# Patient Record
Sex: MALE | Race: WHITE | NOT HISPANIC OR LATINO | Employment: UNEMPLOYED | URBAN - NONMETROPOLITAN AREA
[De-identification: names, ages, dates, MRNs, and addresses within clinical notes are randomized per-mention and may not be internally consistent; named-entity substitution may affect disease eponyms.]

---

## 2021-07-10 ENCOUNTER — APPOINTMENT (OUTPATIENT)
Dept: CT IMAGING | Facility: HOSPITAL | Age: 46
End: 2021-07-10

## 2021-07-10 ENCOUNTER — APPOINTMENT (OUTPATIENT)
Dept: GENERAL RADIOLOGY | Facility: HOSPITAL | Age: 46
End: 2021-07-10

## 2021-07-10 ENCOUNTER — HOSPITAL ENCOUNTER (EMERGENCY)
Facility: HOSPITAL | Age: 46
Discharge: HOME | End: 2021-07-10
Attending: FAMILY MEDICINE | Admitting: EMERGENCY MEDICINE

## 2021-07-10 VITALS
HEART RATE: 93 BPM | BODY MASS INDEX: 24.78 KG/M2 | TEMPERATURE: 98.2 F | HEIGHT: 73 IN | RESPIRATION RATE: 18 BRPM | DIASTOLIC BLOOD PRESSURE: 103 MMHG | WEIGHT: 187 LBS | OXYGEN SATURATION: 97 % | SYSTOLIC BLOOD PRESSURE: 149 MMHG

## 2021-07-10 DIAGNOSIS — R09.1 PLEURISY: Primary | ICD-10-CM

## 2021-07-10 LAB
ALBUMIN SERPL-MCNC: 4.22 G/DL (ref 3.5–5.2)
ALBUMIN/GLOB SERPL: 2.1 G/DL
ALP SERPL-CCNC: 68 U/L (ref 39–117)
ALT SERPL W P-5'-P-CCNC: 9 U/L (ref 1–41)
ANION GAP SERPL CALCULATED.3IONS-SCNC: 10.5 MMOL/L (ref 5–15)
AST SERPL-CCNC: 15 U/L (ref 1–40)
BASOPHILS # BLD AUTO: 0.07 10*3/MM3 (ref 0–0.2)
BASOPHILS NFR BLD AUTO: 0.6 % (ref 0–1.5)
BILIRUB SERPL-MCNC: 0.3 MG/DL (ref 0–1.2)
BUN SERPL-MCNC: 22 MG/DL (ref 6–20)
BUN/CREAT SERPL: 26.2 (ref 7–25)
CALCIUM SPEC-SCNC: 9 MG/DL (ref 8.6–10.5)
CHLORIDE SERPL-SCNC: 109 MMOL/L (ref 98–107)
CO2 SERPL-SCNC: 23.5 MMOL/L (ref 22–29)
CREAT SERPL-MCNC: 0.84 MG/DL (ref 0.76–1.27)
D DIMER PPP FEU-MCNC: 0.3 MCGFEU/ML (ref 0–0.5)
D-LACTATE SERPL-SCNC: 0.6 MMOL/L (ref 0.5–2)
DEPRECATED RDW RBC AUTO: 43.4 FL (ref 37–54)
EOSINOPHIL # BLD AUTO: 0.27 10*3/MM3 (ref 0–0.4)
EOSINOPHIL NFR BLD AUTO: 2.4 % (ref 0.3–6.2)
ERYTHROCYTE [DISTWIDTH] IN BLOOD BY AUTOMATED COUNT: 13.2 % (ref 12.3–15.4)
FLUAV RNA RESP QL NAA+PROBE: NOT DETECTED
FLUBV RNA RESP QL NAA+PROBE: NOT DETECTED
GFR SERPL CREATININE-BSD FRML MDRD: 99 ML/MIN/1.73
GLOBULIN UR ELPH-MCNC: 2 GM/DL
GLUCOSE SERPL-MCNC: 90 MG/DL (ref 65–99)
HCT VFR BLD AUTO: 41.1 % (ref 37.5–51)
HGB BLD-MCNC: 14.2 G/DL (ref 13–17.7)
HOLD SPECIMEN: NORMAL
HOLD SPECIMEN: NORMAL
IMM GRANULOCYTES # BLD AUTO: 0.06 10*3/MM3 (ref 0–0.05)
IMM GRANULOCYTES NFR BLD AUTO: 0.5 % (ref 0–0.5)
INR PPP: 0.93 (ref 0.9–1.1)
LYMPHOCYTES # BLD AUTO: 3.59 10*3/MM3 (ref 0.7–3.1)
LYMPHOCYTES NFR BLD AUTO: 32 % (ref 19.6–45.3)
MAGNESIUM SERPL-MCNC: 2 MG/DL (ref 1.6–2.6)
MCH RBC QN AUTO: 30.8 PG (ref 26.6–33)
MCHC RBC AUTO-ENTMCNC: 34.5 G/DL (ref 31.5–35.7)
MCV RBC AUTO: 89.2 FL (ref 79–97)
MONOCYTES # BLD AUTO: 0.74 10*3/MM3 (ref 0.1–0.9)
MONOCYTES NFR BLD AUTO: 6.6 % (ref 5–12)
NEUTROPHILS NFR BLD AUTO: 57.9 % (ref 42.7–76)
NEUTROPHILS NFR BLD AUTO: 6.49 10*3/MM3 (ref 1.7–7)
NRBC BLD AUTO-RTO: 0 /100 WBC (ref 0–0.2)
NT-PROBNP SERPL-MCNC: 72.6 PG/ML (ref 0–450)
PLATELET # BLD AUTO: 206 10*3/MM3 (ref 140–450)
PMV BLD AUTO: 9.5 FL (ref 6–12)
POTASSIUM SERPL-SCNC: 4.2 MMOL/L (ref 3.5–5.2)
PROT SERPL-MCNC: 6.2 G/DL (ref 6–8.5)
PROTHROMBIN TIME: 12.9 SECONDS (ref 12.8–14.5)
RBC # BLD AUTO: 4.61 10*6/MM3 (ref 4.14–5.8)
SARS-COV-2 RNA RESP QL NAA+PROBE: NOT DETECTED
SODIUM SERPL-SCNC: 143 MMOL/L (ref 136–145)
TROPONIN T SERPL-MCNC: <0.01 NG/ML (ref 0–0.03)
WBC # BLD AUTO: 11.22 10*3/MM3 (ref 3.4–10.8)
WHOLE BLOOD HOLD SPECIMEN: NORMAL

## 2021-07-10 PROCEDURE — 85379 FIBRIN DEGRADATION QUANT: CPT | Performed by: FAMILY MEDICINE

## 2021-07-10 PROCEDURE — 71275 CT ANGIOGRAPHY CHEST: CPT

## 2021-07-10 PROCEDURE — 25010000002 DEXAMETHASONE PER 1 MG: Performed by: EMERGENCY MEDICINE

## 2021-07-10 PROCEDURE — 87636 SARSCOV2 & INF A&B AMP PRB: CPT | Performed by: FAMILY MEDICINE

## 2021-07-10 PROCEDURE — 83880 ASSAY OF NATRIURETIC PEPTIDE: CPT | Performed by: FAMILY MEDICINE

## 2021-07-10 PROCEDURE — 71045 X-RAY EXAM CHEST 1 VIEW: CPT

## 2021-07-10 PROCEDURE — 80053 COMPREHEN METABOLIC PANEL: CPT | Performed by: FAMILY MEDICINE

## 2021-07-10 PROCEDURE — 93005 ELECTROCARDIOGRAM TRACING: CPT | Performed by: FAMILY MEDICINE

## 2021-07-10 PROCEDURE — 96375 TX/PRO/DX INJ NEW DRUG ADDON: CPT

## 2021-07-10 PROCEDURE — 25010000002 KETOROLAC TROMETHAMINE PER 15 MG: Performed by: EMERGENCY MEDICINE

## 2021-07-10 PROCEDURE — 84484 ASSAY OF TROPONIN QUANT: CPT | Performed by: FAMILY MEDICINE

## 2021-07-10 PROCEDURE — 85610 PROTHROMBIN TIME: CPT | Performed by: FAMILY MEDICINE

## 2021-07-10 PROCEDURE — 96374 THER/PROPH/DIAG INJ IV PUSH: CPT

## 2021-07-10 PROCEDURE — 83735 ASSAY OF MAGNESIUM: CPT | Performed by: FAMILY MEDICINE

## 2021-07-10 PROCEDURE — 99284 EMERGENCY DEPT VISIT MOD MDM: CPT

## 2021-07-10 PROCEDURE — 93010 ELECTROCARDIOGRAM REPORT: CPT | Performed by: INTERNAL MEDICINE

## 2021-07-10 PROCEDURE — 83605 ASSAY OF LACTIC ACID: CPT | Performed by: FAMILY MEDICINE

## 2021-07-10 PROCEDURE — 0 IOPAMIDOL PER 1 ML: Performed by: EMERGENCY MEDICINE

## 2021-07-10 PROCEDURE — 85025 COMPLETE CBC W/AUTO DIFF WBC: CPT | Performed by: FAMILY MEDICINE

## 2021-07-10 RX ORDER — PREDNISONE 20 MG/1
20 TABLET ORAL
Qty: 21 TABLET | Refills: 0 | Status: SHIPPED | OUTPATIENT
Start: 2021-07-10 | End: 2021-07-10 | Stop reason: SDUPTHER

## 2021-07-10 RX ORDER — ASPIRIN 81 MG/1
324 TABLET, CHEWABLE ORAL ONCE
Status: COMPLETED | OUTPATIENT
Start: 2021-07-10 | End: 2021-07-10

## 2021-07-10 RX ORDER — KETOROLAC TROMETHAMINE 10 MG/1
10 TABLET, FILM COATED ORAL EVERY 6 HOURS PRN
Qty: 15 TABLET | Refills: 0 | Status: SHIPPED | OUTPATIENT
Start: 2021-07-10 | End: 2021-08-24

## 2021-07-10 RX ORDER — KETOROLAC TROMETHAMINE 30 MG/ML
30 INJECTION, SOLUTION INTRAMUSCULAR; INTRAVENOUS ONCE
Status: COMPLETED | OUTPATIENT
Start: 2021-07-10 | End: 2021-07-10

## 2021-07-10 RX ORDER — SODIUM CHLORIDE 0.9 % (FLUSH) 0.9 %
10 SYRINGE (ML) INJECTION AS NEEDED
Status: DISCONTINUED | OUTPATIENT
Start: 2021-07-10 | End: 2021-07-10 | Stop reason: HOSPADM

## 2021-07-10 RX ORDER — KETOROLAC TROMETHAMINE 10 MG/1
10 TABLET, FILM COATED ORAL EVERY 6 HOURS PRN
Qty: 15 TABLET | Refills: 0 | Status: SHIPPED | OUTPATIENT
Start: 2021-07-10 | End: 2021-07-10 | Stop reason: SDUPTHER

## 2021-07-10 RX ORDER — PREDNISONE 20 MG/1
20 TABLET ORAL
Qty: 21 TABLET | Refills: 0 | Status: SHIPPED | OUTPATIENT
Start: 2021-07-10 | End: 2021-08-24

## 2021-07-10 RX ORDER — DEXAMETHASONE SODIUM PHOSPHATE 10 MG/ML
10 INJECTION INTRAMUSCULAR; INTRAVENOUS ONCE
Status: COMPLETED | OUTPATIENT
Start: 2021-07-10 | End: 2021-07-10

## 2021-07-10 RX ADMIN — IOPAMIDOL 70 ML: 755 INJECTION, SOLUTION INTRAVENOUS at 19:22

## 2021-07-10 RX ADMIN — DEXAMETHASONE SODIUM PHOSPHATE 10 MG: 10 INJECTION INTRAMUSCULAR; INTRAVENOUS at 21:23

## 2021-07-10 RX ADMIN — KETOROLAC TROMETHAMINE 30 MG: 30 INJECTION, SOLUTION INTRAMUSCULAR; INTRAVENOUS at 21:24

## 2021-07-10 RX ADMIN — ASPIRIN 324 MG: 81 TABLET, CHEWABLE ORAL at 17:49

## 2021-07-10 NOTE — ED PROVIDER NOTES
Subjective   46-year-old male with history of hypertension presents to the emergency room with complaints of chest pain.  Patient reports left-sided chest pain for the past week.  He states that chest pain is sharp made worse with deep inspiration and coughing.  He states cough is nonproductive.  He reports that symptoms first began while hiking in [place] near one of his properties.  He states he started having this sharp left-sided chest pain denies radiation of pain.  He denies similar symptoms in the past.  Patient does report that he is a smoker.  Denies family history of cardiac disease.  Patient states that he went to an urgent treatment center on Thursday as well as had x-rays performed and is scheduled to see cardiology August 24.      History provided by:  Patient   used: No    Chest Pain  Pain location:  L chest  Pain quality: sharp    Pain radiates to:  Does not radiate  Pain severity:  Moderate  Duration:  1 week  Timing:  Constant  Relieved by:  Nothing  Worsened by:  Coughing, exertion and movement  Associated symptoms: cough    Associated symptoms: no abdominal pain, no back pain, no fever, no nausea, no palpitations, no PND, no shortness of breath, no vomiting and no weakness    Risk factors: hypertension, male sex and smoking        Review of Systems   Constitutional: Negative for fever.   Respiratory: Positive for cough. Negative for shortness of breath.    Cardiovascular: Positive for chest pain. Negative for palpitations and PND.   Gastrointestinal: Negative for abdominal pain, nausea and vomiting.   Musculoskeletal: Negative for back pain.   Neurological: Negative for weakness.   All other systems reviewed and are negative.      Past Medical History:   Diagnosis Date   • Arthritis    • Hypertension    • Lumbar disc herniation        Allergies   Allergen Reactions   • Penicillins        Past Surgical History:   Procedure Laterality Date   • MINIMALLY INVASIVE  MICRODISCECTOMY LUMBAR SPINE  2009   • MINIMALLY INVASIVE MICRODISCECTOMY LUMBAR SPINE  2016       No family history on file.    Social History     Socioeconomic History   • Marital status:      Spouse name: Not on file   • Number of children: Not on file   • Years of education: Not on file   • Highest education level: Not on file   Tobacco Use   • Smoking status: Current Every Day Smoker     Packs/day: 1.00     Types: Cigarettes   Substance and Sexual Activity   • Alcohol use: Yes     Comment: rarely   • Drug use: No   • Sexual activity: Defer           Objective   Physical Exam  Vitals and nursing note reviewed.   Constitutional:       Appearance: He is not ill-appearing.   HENT:      Head: Normocephalic and atraumatic.      Comments: Moist mucous membranes  Eyes:      Pupils: Pupils are equal, round, and reactive to light.   Neck:      Vascular: No JVD.   Cardiovascular:      Rate and Rhythm: Normal rate.  No extrasystoles are present.     Pulses:           Radial pulses are 2+ on the right side and 2+ on the left side.        Dorsalis pedis pulses are 2+ on the right side and 2+ on the left side.      Heart sounds: No murmur heard.     Pulmonary:      Effort: Pulmonary effort is normal.      Breath sounds: Normal breath sounds.      Comments: No rhonchi's rales or wheezes.  No accessory muscle use chest wall nontender to palpation.  No crepitance.  Symmetric chest wall movement.  Abdominal:      General: Bowel sounds are normal. There is no abdominal bruit.      Palpations: Abdomen is soft.      Tenderness: There is no abdominal tenderness. There is no guarding or rebound.   Musculoskeletal:         General: Normal range of motion.      Cervical back: Neck supple.      Right lower leg: No tenderness. No edema.      Left lower leg: No tenderness. No edema.   Skin:     General: Skin is warm and dry.      Capillary Refill: Capillary refill takes less than 2 seconds.   Neurological:      General: No focal  deficit present.      Mental Status: He is alert and oriented to person, place, and time.      Cranial Nerves: No cranial nerve deficit.   Psychiatric:         Mood and Affect: Mood normal.         Procedures           ED Course  ED Course as of Jul 10 2145   Sat Jul 10, 2021   1733 EKG normal sinus rhythm rate 73   .    [BB]   1802 Patient's lactic acid unremarkable patient WBC 11.22    [BB]   1805 Chest x-ray is unremarkable    [BB]   1822 Covid flu negative    [BB]   1827 D-dimer coags unremarkable    [BB]   1842 BNP unremarkable    [BB]   1844 Patient troponin negative x1 set.    [BB]   1911 Have discussed case with Dr. Hammer who has assumed care    [BB]   2043 IMPRESSION:  Impression:     1. No evidence of pulmonary embolus on this study.     2. Mild bibasilar dependent atelectasis.   CT Chest Pulmonary Embolism [ES]   2043 IMPRESSION:  No acute cardiopulmonary findings.   XR Chest 1 View [ES]   2044 Normal sinus rhythm  Possible Left atrial enlargement  Borderline ECG  When compared with ECG of 19-APR-2016 17:05,  No significant change was found  Vent. rate 73 BPM  OK interval 138 ms  QRS duration 106 ms  QT/QTcB 388/427 ms  P-R-T axes 72 89 45   ECG 12 Lead [ES]      ED Course User Index  [BB] Fermin Srivastava MD  [ES] Sivakumar Hammer MD                                           MDM  Number of Diagnoses or Management Options  Pleurisy: new and requires workup     Amount and/or Complexity of Data Reviewed  Clinical lab tests: reviewed and ordered  Tests in the radiology section of CPT®: reviewed and ordered  Tests in the medicine section of CPT®: reviewed and ordered  Review and summarize past medical records: yes  Discuss the patient with other providers: yes  Independent visualization of images, tracings, or specimens: yes    Risk of Complications, Morbidity, and/or Mortality  Presenting problems: moderate  Diagnostic procedures: moderate  Management options:  moderate    Patient Progress  Patient progress: stable      Final diagnoses:   Pleurisy       ED Disposition  ED Disposition     ED Disposition Condition Comment    Discharge Stable           Garry Yang MD  [address]  [facility] [address] [phone]    Schedule an appointment as soon as possible for a visit in 1 day  EVALUATE         Medication List      New Prescriptions    ketorolac 10 MG tablet  Commonly known as: TORADOL  Take 1 tablet by mouth Every 6 (Six) Hours As Needed for Severe Pain .     predniSONE 20 MG tablet  Commonly known as: DELTASONE  Take 1 tablet by mouth 3 (Three) Times a Day With Meals.           Where to Get Your Medications      You can get these medications from any pharmacy    Bring a paper prescription for each of these medications  · ketorolac 10 MG tablet  · predniSONE 20 MG tablet          Sivakumar Hammer MD  07/10/21 [id number]

## 2021-07-11 LAB
QT INTERVAL: 388 MS
QTC INTERVAL: 427 MS

## 2021-08-24 ENCOUNTER — OFFICE VISIT (OUTPATIENT)
Dept: CARDIOLOGY | Facility: CLINIC | Age: 46
End: 2021-08-24

## 2021-08-24 VITALS
TEMPERATURE: 98 F | DIASTOLIC BLOOD PRESSURE: 76 MMHG | SYSTOLIC BLOOD PRESSURE: 125 MMHG | WEIGHT: 170.2 LBS | HEIGHT: 73 IN | HEART RATE: 72 BPM | BODY MASS INDEX: 22.56 KG/M2

## 2021-08-24 DIAGNOSIS — R07.2 PRECORDIAL PAIN: Primary | ICD-10-CM

## 2021-08-24 DIAGNOSIS — Z72.0 TOBACCO ABUSE: ICD-10-CM

## 2021-08-24 PROCEDURE — 99204 OFFICE O/P NEW MOD 45 MIN: CPT | Performed by: INTERNAL MEDICINE

## 2021-08-24 PROCEDURE — 93000 ELECTROCARDIOGRAM COMPLETE: CPT | Performed by: INTERNAL MEDICINE

## 2021-08-24 RX ORDER — TRAMADOL HYDROCHLORIDE 50 MG/1
50 TABLET ORAL 3 TIMES DAILY PRN
COMMUNITY
Start: 2021-05-27 | End: 2021-09-06

## 2021-08-24 NOTE — PROGRESS NOTES
Garry Yang MD  Randy Mccarthy  1975 08/24/2021    Patient Active Problem List   Diagnosis   • Lumbar disc herniation   • Neuritis or radiculitis due to rupture of lumbar intervertebral disc   • Foraminal stenosis of lumbar region       Dear Garry Yang MD:    Subjective     Randy Mccarthy is a 46 y.o. male with the problems as listed above, presents    Chief complaint: Recent chest pains.    History of Present Illness: Mr. Mccarthy is a pleasant 45-year-old  male with no history of known heart disease or coronary artery disease, nondiabetic, nonhypertensive, has history of smoking about a pack a day for the 30 years, presents with complaints of having left-sided chest pain about 8 weeks ago.  This was a localized sharp pain associated with some shortness of breath.  He went to the emergency room at [facility] where he was evaluated and was diagnosed with pleurisy.  Patient was given some steroids and antibiotics and the pain has gotten better since.  He denies any significant dyspnea, PND, orthopnea or pedal edema.  He denies any family history of premature coronary artery disease.      Allergies   Allergen Reactions   • Penicillins        Current Outpatient Medications:   •  ibuprofen (ADVIL,MOTRIN) 800 MG tablet, Take 800 mg by mouth 2 (two) times a day., Disp: , Rfl:   •  traMADol (ULTRAM) 50 MG tablet, Take 50 mg by mouth 3 (Three) Times a Day As Needed., Disp: , Rfl:     Past Medical History:   Diagnosis Date   • Arthritis    • Hypertension    • Lumbar disc herniation      Past Surgical History:   Procedure Laterality Date   • MINIMALLY INVASIVE MICRODISCECTOMY LUMBAR SPINE  2009   • MINIMALLY INVASIVE MICRODISCECTOMY LUMBAR SPINE  2016     History reviewed. No pertinent family history.  Social History     Tobacco Use   • Smoking status: Current Every Day Smoker     Packs/day: 1.00     Types: Cigarettes   • Smokeless tobacco: Never Used   Substance Use Topics   •  "Alcohol use: Yes     Comment: rarely   • Drug use: No       Review of Systems   Constitutional: Negative for chills, fever, malaise/fatigue, weight gain and weight loss.   HENT: Negative for congestion and nosebleeds.    Cardiovascular: Positive for chest pain. Negative for irregular heartbeat, leg swelling and orthopnea.   Respiratory: Negative for cough, hemoptysis and shortness of breath.    Skin: Negative.    Musculoskeletal: Positive for back pain and muscle cramps.   Gastrointestinal: Negative for abdominal pain, change in bowel habit, hematemesis, melena and vomiting.   Genitourinary: Negative for dysuria, frequency and hematuria.   Neurological: Negative for focal weakness, headaches, light-headedness and weakness.   Psychiatric/Behavioral: Negative.      Objective   Blood pressure 125/76, pulse 72, temperature 98 °F (36.7 °C), height 185.4 cm (72.99\"), weight 77.2 kg (170 lb 3.2 oz).  Body mass index is 22.46 kg/m².    Vitals reviewed.   Constitutional:       Appearance: Well-developed.   Eyes:      Conjunctiva/sclera: Conjunctivae normal.   HENT:      Head: Normocephalic.   Neck:      Thyroid: No thyromegaly.      Vascular: No JVD.      Trachea: No tracheal deviation.   Pulmonary:      Effort: No respiratory distress.      Breath sounds: Normal breath sounds. No wheezing. No rales.   Cardiovascular:      PMI at left midclavicular line. Normal rate. Regular rhythm. Normal S1. Normal S2.      Murmurs: There is no murmur.      No gallop. No click. No rub.   Pulses:     Intact distal pulses.   Edema:     Peripheral edema absent.   Abdominal:      General: Bowel sounds are normal.      Palpations: Abdomen is soft. There is no abdominal mass.      Tenderness: There is no abdominal tenderness.   Musculoskeletal:      Cervical back: Normal range of motion and neck supple. Skin:     General: Skin is warm and dry.   Neurological:      Mental Status: Alert and oriented to person, place, and time.      Cranial Nerves: " No cranial nerve deficit.         Lab Results   Component Value Date     07/10/2021    K 4.2 07/10/2021     (H) 07/10/2021    CO2 23.5 07/10/2021    BUN 22 (H) 07/10/2021    CREATININE 0.84 07/10/2021    GLUCOSE 90 07/10/2021    CALCIUM 9.0 07/10/2021    AST 15 07/10/2021    ALT 9 07/10/2021    ALKPHOS 68 07/10/2021     No results found for: CKTOTAL  Lab Results   Component Value Date    WBC 11.22 (H) 07/10/2021    HGB 14.2 07/10/2021    HCT 41.1 07/10/2021     07/10/2021     Lab Results   Component Value Date    INR 0.93 07/10/2021    INR 0.98 04/19/2016     Lab Results   Component Value Date    MG 2.0 07/10/2021         ECG 12 Lead    Date/Time: 8/24/2021 2:36 PM  Performed by: Rj Bowman MD  Authorized by: Rj Bowman MD   Comparison: compared with previous ECG from 7/10/2021  Similar to previous ECG  Rhythm: sinus rhythm  BPM: 71  Conduction: conduction normal  ST Segments: ST segments normal  T Waves: T waves normal                  Assessment/Plan :   Diagnosis Plan   1. Precordial pain  Treadmill Stress Test   2. Tobacco abuse           Recommendations:  Orders Placed This Encounter   Procedures   • Treadmill Stress Test   • ECG 12 Lead      1. We will evaluate his chest pains with a treadmill stress EKG test due to risk factors of age, male gender and smoking.  2. I have also strongly emphasized for him to quit smoking.    Return in about 6 weeks (around 10/5/2021).    As always, I appreciate very much the opportunity to participate in the cardiovascular care of your patients.      With Best Regards,    Rj Bowman MD, Merged with [facility]    Dragon disclaimer:  Much of this encounter note is an electronic transcription/translation of spoken language to printed text. The electronic translation of spoken language may permit erroneous, or at times, nonsensical words or phrases to be inadvertently transcribed; Although I have reviewed the note for such errors, some may still exist.

## 2021-09-06 ENCOUNTER — HOSPITAL ENCOUNTER (EMERGENCY)
Facility: HOSPITAL | Age: 46
Discharge: HOME | End: 2021-09-06
Attending: STUDENT IN AN ORGANIZED HEALTH CARE EDUCATION/TRAINING PROGRAM | Admitting: STUDENT IN AN ORGANIZED HEALTH CARE EDUCATION/TRAINING PROGRAM

## 2021-09-06 ENCOUNTER — APPOINTMENT (OUTPATIENT)
Dept: GENERAL RADIOLOGY | Facility: HOSPITAL | Age: 46
End: 2021-09-06

## 2021-09-06 VITALS
SYSTOLIC BLOOD PRESSURE: 137 MMHG | OXYGEN SATURATION: 97 % | HEIGHT: 73 IN | WEIGHT: 180 LBS | BODY MASS INDEX: 23.86 KG/M2 | HEART RATE: 73 BPM | RESPIRATION RATE: 16 BRPM | TEMPERATURE: 98.3 F | DIASTOLIC BLOOD PRESSURE: 83 MMHG

## 2021-09-06 DIAGNOSIS — S92.044A CLOSED NONDISPLACED FRACTURE OF TUBEROSITY OF RIGHT CALCANEUS, UNSPECIFIED FRACTURE MORPHOLOGY, INITIAL ENCOUNTER: Primary | ICD-10-CM

## 2021-09-06 PROCEDURE — 73610 X-RAY EXAM OF ANKLE: CPT

## 2021-09-06 PROCEDURE — 73630 X-RAY EXAM OF FOOT: CPT

## 2021-09-06 PROCEDURE — 99283 EMERGENCY DEPT VISIT LOW MDM: CPT

## 2021-09-06 RX ORDER — HYDROCODONE BITARTRATE AND ACETAMINOPHEN 5; 325 MG/1; MG/1
1 TABLET ORAL ONCE
Status: COMPLETED | OUTPATIENT
Start: 2021-09-06 | End: 2021-09-06

## 2021-09-06 RX ORDER — HYDROCODONE BITARTRATE AND ACETAMINOPHEN 5; 325 MG/1; MG/1
1 TABLET ORAL EVERY 6 HOURS PRN
Qty: 12 TABLET | Refills: 0 | Status: SHIPPED | OUTPATIENT
Start: 2021-09-06 | End: 2021-09-09

## 2021-09-06 RX ADMIN — HYDROCODONE BITARTRATE AND ACETAMINOPHEN 1 TABLET: 5; 325 TABLET ORAL at 18:16

## 2021-09-06 NOTE — ED TRIAGE NOTES
MEDICAL SCREENING:    Reason for Visit: right foot and ankle pain since fall yesterday    Patient initially seen in triage.  The patient was advised further evaluation and diagnostic testing will be needed, some of the treatment and testing will be initiated in the lobby in order to begin the process.  The patient will be returned to the waiting area for the time being and possibly be re-assessed by a subsequent ED provider.  The patient will be brought back to the treatment area in as timely manner as possible.

## 2021-09-06 NOTE — ED PROVIDER NOTES
Subjective   This is a 46 year old male patient who presents to the ER with chief complaint of right foot injury. Last night, he slipped off of an embankment and twisted his right foot and ankle. Since then he has had pain in the right ankle radiating into the right foot that is aching and moderate. Able to ambulated but painful. No numbness or tingling.           Review of Systems   Constitutional: Negative.  Negative for fever.   HENT: Negative.    Respiratory: Negative.    Cardiovascular: Negative.  Negative for chest pain.   Gastrointestinal: Negative.  Negative for abdominal pain.   Endocrine: Negative.    Genitourinary: Negative.  Negative for dysuria.   Musculoskeletal: Negative for arthralgias, back pain, gait problem, joint swelling, myalgias, neck pain and neck stiffness.        Right foot and ankle pain    Skin: Negative.    Neurological: Negative.    Psychiatric/Behavioral: Negative.    All other systems reviewed and are negative.      Past Medical History:   Diagnosis Date   • Arthritis    • Hypertension    • Lumbar disc herniation        Allergies   Allergen Reactions   • Penicillins Unknown - High Severity     Pt does not recall reaction.        Past Surgical History:   Procedure Laterality Date   • MINIMALLY INVASIVE MICRODISCECTOMY LUMBAR SPINE  2009   • MINIMALLY INVASIVE MICRODISCECTOMY LUMBAR SPINE  2016       No family history on file.    Social History     Socioeconomic History   • Marital status:      Spouse name: Not on file   • Number of children: Not on file   • Years of education: Not on file   • Highest education level: Not on file   Tobacco Use   • Smoking status: Current Every Day Smoker     Packs/day: 1.00     Types: Cigarettes   • Smokeless tobacco: Never Used   Substance and Sexual Activity   • Alcohol use: Yes     Comment: rarely   • Drug use: No   • Sexual activity: Defer           Objective   Physical Exam  Vitals and nursing note reviewed.   Constitutional:       General:  He is not in acute distress.     Appearance: He is well-developed. He is not diaphoretic.   HENT:      Head: Normocephalic and atraumatic.      Right Ear: External ear normal.      Left Ear: External ear normal.      Nose: Nose normal.   Eyes:      Conjunctiva/sclera: Conjunctivae normal.      Pupils: Pupils are equal, round, and reactive to light.   Neck:      Vascular: No JVD.      Trachea: No tracheal deviation.   Cardiovascular:      Rate and Rhythm: Normal rate and regular rhythm.      Heart sounds: Normal heart sounds. No murmur heard.     Pulmonary:      Effort: Pulmonary effort is normal. No respiratory distress.      Breath sounds: Normal breath sounds. No wheezing.   Abdominal:      General: Bowel sounds are normal.      Palpations: Abdomen is soft.      Tenderness: There is no abdominal tenderness.   Musculoskeletal:         General: Swelling, tenderness and signs of injury present. No deformity. Normal range of motion.      Cervical back: Normal range of motion and neck supple.      Comments: Right foot and ankle skin intact with no bruising or abrasion. Edema and tenderness to palpation noted diffusely. ROM active but limited and painful. Neurovascular status and sensation RLE intact.    Skin:     General: Skin is warm and dry.      Coloration: Skin is not pale.      Findings: No erythema or rash.   Neurological:      Mental Status: He is alert and oriented to person, place, and time.      Cranial Nerves: No cranial nerve deficit.   Psychiatric:         Behavior: Behavior normal.         Thought Content: Thought content normal.         Splint - Cast - Strapping    Date/Time: 9/6/2021 6:11 PM  Performed by: Kendal Bustillo PA  Authorized by: Cyrus Davis DO     Consent:     Consent obtained:  Verbal    Consent given by:  Patient    Risks discussed:  Discoloration, numbness, pain and swelling    Alternatives discussed:  Referral, observation, alternative treatment, delayed treatment and no  treatment  Pre-procedure details:     Sensation:  Normal    Skin color:  Normal  Procedure details:     Laterality:  Right    Location:  Foot    Foot:  R foot    Strapping: no      Splint type:  CAM walker boot    Supplies:  Prefabricated splint  Post-procedure details:     Pain:  Improved    Sensation:  Normal    Skin color:  Normal    Patient tolerance of procedure:  Tolerated well, no immediate complications               ED Course  ED Course as of Sep 06 1812   Mon Sep 06, 2021   1732 IMPRESSION:  Minimally displaced likely intra-articular fracture of the posterior tuberosity and posterior facet of the calcaneus. Associated soft tissue swelling.     Signer Name: LAUREL Ochoa MD   Signed: 9/6/2021 5:21 PM   Workstation Name: [facility]    [facility]   XR Ankle 3+ View Right [MM]   1732 IMPRESSION:  Minimally displaced likely intra-articular fracture of the posterior tuberosity and posterior facet of the calcaneus. Associated soft tissue swelling.     Signer Name: LAUREL Ochoa MD   Signed: 9/6/2021 5:21 PM   Workstation Name: [facility]    [facility]   XR Foot 3+ View Right [MM]   1806 No previous prescriptions per lydia report. Will be d/c home with rx for norco in boot with no weight bearing on right calcaneal fracture. Will call ortho tomorrow or return to ER if symptoms worsen.     [MM]      ED Course User Index  [MM] Kendal Bustillo PA                                           MDM  Number of Diagnoses or Management Options     Amount and/or Complexity of Data Reviewed  Tests in the radiology section of CPT®: ordered and reviewed  Discuss the patient with other providers: yes        Final diagnoses:   Closed nondisplaced fracture of tuberosity of right calcaneus, unspecified fracture morphology, initial encounter       ED Disposition  ED Disposition     ED Disposition Condition Comment    Discharge Stable           Walt Martínez, DO  [address]  [facility] [address]  [phone]    Call on 9/7/2021           Medication List      Stop    traMADol 50 MG tablet  Commonly known as: Kendal Ruano PA  09/06/21 181

## 2021-09-06 NOTE — DISCHARGE INSTRUCTIONS
Please remain in boot and don't put weight on your foot. Please utilize your pain medication, rest, ice and elevation for pain relief. Please call ortho for your appointment tomorrow. Please return to ER if symptoms worsen.

## 2021-09-07 ENCOUNTER — HOSPITAL ENCOUNTER (OUTPATIENT)
Dept: HOSPITAL 79 - KOH-I | Age: 46
End: 2021-09-07
Attending: PODIATRIST
Payer: COMMERCIAL

## 2021-09-07 DIAGNOSIS — S92.011A: Primary | ICD-10-CM

## 2021-09-08 ENCOUNTER — APPOINTMENT (OUTPATIENT)
Dept: CARDIOLOGY | Facility: HOSPITAL | Age: 46
End: 2021-09-08

## 2021-10-18 ENCOUNTER — HOSPITAL ENCOUNTER (OUTPATIENT)
Dept: HOSPITAL 79 - KOH-I | Age: 46
End: 2021-10-18
Attending: PODIATRIST
Payer: COMMERCIAL

## 2021-10-18 DIAGNOSIS — S92.001D: Primary | ICD-10-CM

## 2024-02-13 PROBLEM — G89.4 CHRONIC PAIN SYNDROME: Status: ACTIVE | Noted: 2024-02-13

## 2024-02-13 PROBLEM — M96.1 POSTLAMINECTOMY SYNDROME, LUMBAR REGION: Status: ACTIVE | Noted: 2024-02-13

## 2024-02-13 NOTE — H&P (VIEW-ONLY)
[facility]         History and Physical    MRN: [id number]  Patient Name: Randy Mccarthy  : 1975  Gender: male   Physician: Moreno Carter DO  Date of Service: 2024    HPI: CHRONIC BACK PAIN    History:    Past Medical History:   Diagnosis Date    Arthritis     Hypertension     Low back pain     Lumbar disc herniation     Lumbosacral disc disease        Past Surgical History:   Procedure Laterality Date    BACK SURGERY      MINIMALLY INVASIVE MICRODISCECTOMY LUMBAR SPINE  2009    MINIMALLY INVASIVE MICRODISCECTOMY LUMBAR SPINE  2016       Social History     Socioeconomic History    Marital status:    Tobacco Use    Smoking status: Every Day     Packs/day: 1.00     Years: 15.00     Additional pack years: 0.00     Total pack years: 15.00     Types: Cigarettes     Start date: 2000    Smokeless tobacco: Never   Substance and Sexual Activity    Alcohol use: Yes     Comment: rarely    Drug use: No    Sexual activity: Yes     Partners: Female       No family history on file.    Prior to Admission Medications:  (Not in a hospital admission)      Allergies:  Allergies   Allergen Reactions    Penicillins Unknown - High Severity     Pt does not recall reaction.         Vitals: BP: ()/()   Arterial Line BP: ()/()      Review of Systems:   Within Normal Limits Abnormal   HEENT [x]    []     Cardiovascular [x]   []     Gastrointestinal [x]   []     Genitourinary [x]   []     Neurologic [x]   []     Pulmonary [x]   []       Physical Exam:   Within Normal Limits Abnormal   Head, Neck, and Throat [x]    []     Heart [x]   []     Lungs [x]   []     Abdomen [x]   []     Extremities [x]   []     Mental Status [x]    []        Diagnosis: M96.1, G89.4    Plan: SPINAL CORD STIMULATOR INSERTION PHASE 2 CPT: 63650X2, 68080 (N/A)     [x]   H&P revealed no contraindication to planned surgery.     [x]   Labs (if ordered) have been reviewed and revealed no contraindications to planned  surgery.    RISKS, BENEFITS, and ALTERNATIVES discussed with patient who would like to proceed.     Moreno Carter,   2/13/2024

## 2024-02-22 ENCOUNTER — ANESTHESIA EVENT (OUTPATIENT)
Dept: PERIOP | Facility: HOSPITAL | Age: 49
End: 2024-02-22
Payer: COMMERCIAL

## 2024-02-26 ENCOUNTER — HOSPITAL ENCOUNTER (OUTPATIENT)
Facility: HOSPITAL | Age: 49
Setting detail: HOSPITAL OUTPATIENT SURGERY
Discharge: HOME | End: 2024-02-26
Attending: ANESTHESIOLOGY | Admitting: ANESTHESIOLOGY
Payer: COMMERCIAL

## 2024-02-26 ENCOUNTER — APPOINTMENT (OUTPATIENT)
Dept: GENERAL RADIOLOGY | Facility: HOSPITAL | Age: 49
End: 2024-02-26
Payer: COMMERCIAL

## 2024-02-26 ENCOUNTER — ANESTHESIA (OUTPATIENT)
Dept: PERIOP | Facility: HOSPITAL | Age: 49
End: 2024-02-26
Payer: COMMERCIAL

## 2024-02-26 VITALS
WEIGHT: 182 LBS | TEMPERATURE: 97 F | HEIGHT: 73 IN | BODY MASS INDEX: 24.12 KG/M2 | DIASTOLIC BLOOD PRESSURE: 85 MMHG | HEART RATE: 68 BPM | RESPIRATION RATE: 18 BRPM | OXYGEN SATURATION: 97 % | SYSTOLIC BLOOD PRESSURE: 123 MMHG

## 2024-02-26 DIAGNOSIS — G89.4 CHRONIC PAIN SYNDROME: ICD-10-CM

## 2024-02-26 DIAGNOSIS — M96.1 POSTLAMINECTOMY SYNDROME, LUMBAR REGION: ICD-10-CM

## 2024-02-26 PROCEDURE — 25010000002 MIDAZOLAM PER 1 MG: Performed by: NURSE ANESTHETIST, CERTIFIED REGISTERED

## 2024-02-26 PROCEDURE — C1889 IMPLANT/INSERT DEVICE, NOC: HCPCS | Performed by: ANESTHESIOLOGY

## 2024-02-26 PROCEDURE — 25010000002 PROPOFOL 200 MG/20ML EMULSION: Performed by: NURSE ANESTHETIST, CERTIFIED REGISTERED

## 2024-02-26 PROCEDURE — C1822 GEN, NEURO, HF, RECHG BAT: HCPCS | Performed by: ANESTHESIOLOGY

## 2024-02-26 PROCEDURE — 25810000003 LACTATED RINGERS PER 1000 ML: Performed by: ANESTHESIOLOGY

## 2024-02-26 PROCEDURE — C1897 LEAD, NEUROSTIM TEST KIT: HCPCS | Performed by: ANESTHESIOLOGY

## 2024-02-26 PROCEDURE — 76000 FLUOROSCOPY <1 HR PHYS/QHP: CPT

## 2024-02-26 PROCEDURE — C1894 INTRO/SHEATH, NON-LASER: HCPCS | Performed by: ANESTHESIOLOGY

## 2024-02-26 PROCEDURE — 25010000002 FENTANYL CITRATE (PF) 50 MCG/ML SOLUTION: Performed by: NURSE ANESTHETIST, CERTIFIED REGISTERED

## 2024-02-26 PROCEDURE — 25010000002 BUPIVACAINE 0.5 % SOLUTION: Performed by: ANESTHESIOLOGY

## 2024-02-26 PROCEDURE — 25010000002 CEFAZOLIN PER 500 MG: Performed by: ANESTHESIOLOGY

## 2024-02-26 PROCEDURE — 25010000002 VANCOMYCIN 1 G RECONSTITUTED SOLUTION: Performed by: ANESTHESIOLOGY

## 2024-02-26 PROCEDURE — 76000 FLUOROSCOPY <1 HR PHYS/QHP: CPT | Performed by: RADIOLOGY

## 2024-02-26 DEVICE — IMPLANTABLE DEVICE: Type: IMPLANTABLE DEVICE | Site: BACK | Status: FUNCTIONAL

## 2024-02-26 DEVICE — KT IPG HFXIQ/SENZA NIPG3000 W/CHRGR/RMT: Type: IMPLANTABLE DEVICE | Site: BACK | Status: FUNCTIONAL

## 2024-02-26 RX ORDER — SODIUM CHLORIDE 0.9 % (FLUSH) 0.9 %
10 SYRINGE (ML) INJECTION EVERY 12 HOURS SCHEDULED
Status: DISCONTINUED | OUTPATIENT
Start: 2024-02-26 | End: 2024-02-26 | Stop reason: HOSPADM

## 2024-02-26 RX ORDER — MEPERIDINE HYDROCHLORIDE 25 MG/ML
12.5 INJECTION INTRAMUSCULAR; INTRAVENOUS; SUBCUTANEOUS
Status: DISCONTINUED | OUTPATIENT
Start: 2024-02-26 | End: 2024-02-26 | Stop reason: HOSPADM

## 2024-02-26 RX ORDER — SODIUM CHLORIDE 9 MG/ML
40 INJECTION, SOLUTION INTRAVENOUS AS NEEDED
Status: DISCONTINUED | OUTPATIENT
Start: 2024-02-26 | End: 2024-02-26 | Stop reason: HOSPADM

## 2024-02-26 RX ORDER — MAGNESIUM HYDROXIDE 1200 MG/15ML
LIQUID ORAL AS NEEDED
Status: DISCONTINUED | OUTPATIENT
Start: 2024-02-26 | End: 2024-02-26 | Stop reason: HOSPADM

## 2024-02-26 RX ORDER — FENTANYL CITRATE 50 UG/ML
50 INJECTION, SOLUTION INTRAMUSCULAR; INTRAVENOUS
Status: DISCONTINUED | OUTPATIENT
Start: 2024-02-26 | End: 2024-02-26 | Stop reason: HOSPADM

## 2024-02-26 RX ORDER — SODIUM CHLORIDE 0.9 % (FLUSH) 0.9 %
10 SYRINGE (ML) INJECTION AS NEEDED
Status: DISCONTINUED | OUTPATIENT
Start: 2024-02-26 | End: 2024-02-26 | Stop reason: HOSPADM

## 2024-02-26 RX ORDER — MIDAZOLAM HYDROCHLORIDE 1 MG/ML
INJECTION INTRAMUSCULAR; INTRAVENOUS AS NEEDED
Status: DISCONTINUED | OUTPATIENT
Start: 2024-02-26 | End: 2024-02-26 | Stop reason: SURG

## 2024-02-26 RX ORDER — SODIUM CHLORIDE, SODIUM LACTATE, POTASSIUM CHLORIDE, CALCIUM CHLORIDE 600; 310; 30; 20 MG/100ML; MG/100ML; MG/100ML; MG/100ML
125 INJECTION, SOLUTION INTRAVENOUS ONCE
Status: DISCONTINUED | OUTPATIENT
Start: 2024-02-26 | End: 2024-02-26 | Stop reason: HOSPADM

## 2024-02-26 RX ORDER — FENTANYL CITRATE 50 UG/ML
INJECTION, SOLUTION INTRAMUSCULAR; INTRAVENOUS AS NEEDED
Status: DISCONTINUED | OUTPATIENT
Start: 2024-02-26 | End: 2024-02-26 | Stop reason: SURG

## 2024-02-26 RX ORDER — LIDOCAINE HYDROCHLORIDE AND EPINEPHRINE 10; 10 MG/ML; UG/ML
INJECTION, SOLUTION INFILTRATION; PERINEURAL AS NEEDED
Status: DISCONTINUED | OUTPATIENT
Start: 2024-02-26 | End: 2024-02-26 | Stop reason: HOSPADM

## 2024-02-26 RX ORDER — KETOROLAC TROMETHAMINE 30 MG/ML
30 INJECTION, SOLUTION INTRAMUSCULAR; INTRAVENOUS EVERY 6 HOURS PRN
Status: DISCONTINUED | OUTPATIENT
Start: 2024-02-26 | End: 2024-02-26 | Stop reason: HOSPADM

## 2024-02-26 RX ORDER — SODIUM CHLORIDE, SODIUM LACTATE, POTASSIUM CHLORIDE, CALCIUM CHLORIDE 600; 310; 30; 20 MG/100ML; MG/100ML; MG/100ML; MG/100ML
100 INJECTION, SOLUTION INTRAVENOUS ONCE AS NEEDED
Status: DISCONTINUED | OUTPATIENT
Start: 2024-02-26 | End: 2024-02-26 | Stop reason: HOSPADM

## 2024-02-26 RX ORDER — PROPOFOL 10 MG/ML
INJECTION, EMULSION INTRAVENOUS AS NEEDED
Status: DISCONTINUED | OUTPATIENT
Start: 2024-02-26 | End: 2024-02-26 | Stop reason: SURG

## 2024-02-26 RX ORDER — MIDAZOLAM HYDROCHLORIDE 1 MG/ML
1 INJECTION INTRAMUSCULAR; INTRAVENOUS
Status: DISCONTINUED | OUTPATIENT
Start: 2024-02-26 | End: 2024-02-26 | Stop reason: HOSPADM

## 2024-02-26 RX ORDER — BUPIVACAINE HYDROCHLORIDE 5 MG/ML
INJECTION, SOLUTION PERINEURAL AS NEEDED
Status: DISCONTINUED | OUTPATIENT
Start: 2024-02-26 | End: 2024-02-26 | Stop reason: HOSPADM

## 2024-02-26 RX ORDER — ONDANSETRON 2 MG/ML
4 INJECTION INTRAMUSCULAR; INTRAVENOUS AS NEEDED
Status: DISCONTINUED | OUTPATIENT
Start: 2024-02-26 | End: 2024-02-26 | Stop reason: HOSPADM

## 2024-02-26 RX ORDER — VANCOMYCIN HYDROCHLORIDE 1 G/20ML
INJECTION, POWDER, LYOPHILIZED, FOR SOLUTION INTRAVENOUS AS NEEDED
Status: DISCONTINUED | OUTPATIENT
Start: 2024-02-26 | End: 2024-02-26 | Stop reason: HOSPADM

## 2024-02-26 RX ORDER — OXYCODONE HYDROCHLORIDE AND ACETAMINOPHEN 5; 325 MG/1; MG/1
1 TABLET ORAL ONCE AS NEEDED
Status: DISCONTINUED | OUTPATIENT
Start: 2024-02-26 | End: 2024-02-26 | Stop reason: HOSPADM

## 2024-02-26 RX ORDER — SODIUM CHLORIDE, SODIUM LACTATE, POTASSIUM CHLORIDE, CALCIUM CHLORIDE 600; 310; 30; 20 MG/100ML; MG/100ML; MG/100ML; MG/100ML
125 INJECTION, SOLUTION INTRAVENOUS ONCE
Status: COMPLETED | OUTPATIENT
Start: 2024-02-26 | End: 2024-02-26

## 2024-02-26 RX ORDER — IPRATROPIUM BROMIDE AND ALBUTEROL SULFATE 2.5; .5 MG/3ML; MG/3ML
3 SOLUTION RESPIRATORY (INHALATION) ONCE AS NEEDED
Status: DISCONTINUED | OUTPATIENT
Start: 2024-02-26 | End: 2024-02-26 | Stop reason: HOSPADM

## 2024-02-26 RX ADMIN — PROPOFOL 100 MCG/KG/MIN: 10 INJECTION, EMULSION INTRAVENOUS at 09:54

## 2024-02-26 RX ADMIN — CEFAZOLIN 2 G: 2 INJECTION, POWDER, FOR SOLUTION INTRAMUSCULAR; INTRAVENOUS at 09:49

## 2024-02-26 RX ADMIN — PROPOFOL 30 MG: 10 INJECTION, EMULSION INTRAVENOUS at 09:53

## 2024-02-26 RX ADMIN — MIDAZOLAM HYDROCHLORIDE 2 MG: 1 INJECTION, SOLUTION INTRAMUSCULAR; INTRAVENOUS at 09:49

## 2024-02-26 RX ADMIN — FENTANYL CITRATE 100 MCG: 50 INJECTION INTRAMUSCULAR; INTRAVENOUS at 09:49

## 2024-02-26 RX ADMIN — SODIUM CHLORIDE, POTASSIUM CHLORIDE, SODIUM LACTATE AND CALCIUM CHLORIDE: 600; 310; 30; 20 INJECTION, SOLUTION INTRAVENOUS at 09:49

## 2024-02-26 NOTE — OP NOTE
Procedure Note  Date of Procedure: 2/26/2024  Patient Name: Randy Mccarthy  Patient MRN: [id number]  YOB: 1975     Preoperative Diagnosis: chronic low back pain     Postoperative Diagnosis: Same     Procedure Performed: Percutaneous spinal cord stimulator implantation     Surgeon: Moreno Carter DO     Anesthesia:  Monitored Anesthesia Care (MAC)     Fluids: Please see anesthesia record    Estimated Blood Loss: None    Grafts & Implants: Spinal Cord Stimulator    Procedure Description:    The patient was taken to the operating suite, and placed in a prone position skin overlying the thoracic and lumbar region of the spine was cleaned with chlorhexidine solution and allowed to dry for approximately 2-3 minutes.  The area was then draped using sterile sheets, towels, and Ioban dressing to create a sterile field.  Sterile technique was maintained throughout the procedure.  Following this a total of 30 of local anesthetic was used to anesthetize the skin and soft tissues in the surgical area.  This was a  1:1 mixture of 0.5% Marcaine with epinephrine and 1% lidocaine plain.  Following this a small vertically oriented incision was made in the left lower lumbar paraspinal tissues.  Blunt dissection was carried out to the level of the prevertebral fascia.  Following this a 14-gauge Touhy needle was advanced through the incision in a left paramedian approach at the  T12/L1  ( insertion site)  intralaminar space.  The epidural space was positively identified using a loss of resistance technique to air.  Following this a nevro lead was advanced within the epidural space to lie at the T8 (vertebral level)  vertebral body slightly left of midline.  Next a second 14-gauge Touhy needle was advanced in a similar manner through the incision, again using a left paramedian approach at the T12\L1 ( insertion site)   intralaminar space.  Again loss-of-resistance technique was used to identify the epidural space and  a second  nevro octrode lead was navigated to lie at the T9   (final lead left at vertebral level)   slightly right of midline. Fluoroscopic examination was consistent with the trial location as well.  At this point, the needles were removed under live fluoroscopy,  leaving the leads in place.  Anchors were placed over each the leads and anchored and locked into place.  A 2-0 silk suture was used to secure the anchors to the prevertebral fascia through each eyelet on each of the anchors.  Following this, a horizontally oriented incision was made in the  leftflank  (location of pocket )  using blunt dissection and Bovie electrocautery.  A pocket was created in this area.  Next, using a tunneling device the leads were tunneled from the paramedian incision to the IPG pocket site.  The leads were then connected to a IQ Nevro ( type of IPG)   IPG.  At this point impedance testing was conducted by Nevro representative and deemed to be appropriate.  Following this a generous amount of bacitracin and water were used to irrigate both incision sites. 0.5g vancomycin powder distributed among the sites as well.  The IPG was then placed in the IPG pocket.  Both incisions were closed using a 2-0 Vicryl suture in a simple interrupted fashion for deep layer closure and stainless steel staples for skin approximation.  The wounds were then dressed with a small piece of Telfa. 4 x 4. and Island dressing.  The patient was taken to the recovery area in stable condition with no complications.     CPT   63650 x2  64712  [id number]     Moreno Carter, DO  2/26/2024

## 2024-02-26 NOTE — ANESTHESIA POSTPROCEDURE EVALUATION
Patient: Randy Mccarthy    Procedure Summary       Date: 02/26/24 Room / Location: [facility] OR 09 /  [facility] OR    Anesthesia Start: 0949 Anesthesia Stop: 1057    Procedure: SPINAL CORD STIMULATOR INSERTION PHASE 2 CPT: 63650X2, 42346 (Back) Diagnosis:       Chronic pain syndrome      Postlaminectomy syndrome, lumbar region      (Chronic pain syndrome [G89.4])      (Postlaminectomy syndrome, lumbar region [M96.1])    Surgeons: Moreno Carter DO Provider: Moncho Cotter MD    Anesthesia Type: MAC ASA Status: 2            Anesthesia Type: MAC    Vitals  Vitals Value Taken Time   /85 02/26/24 1125   Temp 97 °F (36.1 °C) 02/26/24 1100   Pulse 57 02/26/24 1127   Resp 18 02/26/24 1100   SpO2 97 % 02/26/24 1127   Vitals shown include unfiled device data.        Post Anesthesia Care and Evaluation    Patient location during evaluation: PHASE II  Patient participation: complete - patient participated  Level of consciousness: awake and alert  Pain score: 0  Pain management: adequate    Airway patency: patent  Anesthetic complications: No anesthetic complications  PONV Status: controlled  Cardiovascular status: acceptable  Respiratory status: acceptable and room air  Hydration status: euvolemic  No anesthesia care post op

## 2024-02-26 NOTE — ANESTHESIA PREPROCEDURE EVALUATION
Anesthesia Evaluation     no history of anesthetic complications:   NPO Solid Status: > 8 hours  NPO Liquid Status: > 8 hours           Airway   Mallampati: I  TM distance: >3 FB  Neck ROM: full  No difficulty expected  Dental    (+) poor dentition    Pulmonary - normal exam   (+) a smoker Current,  Cardiovascular - normal exam    (+) hypertension, hyperlipidemia      Neuro/Psych  GI/Hepatic/Renal/Endo      Musculoskeletal     (+) back pain, chronic pain  Abdominal  - normal exam    Bowel sounds: normal.   Substance History      OB/GYN          Other                      Anesthesia Plan    ASA 2     MAC     intravenous induction     Anesthetic plan, risks, benefits, and alternatives have been provided, discussed and informed consent has been obtained with: patient.      CODE STATUS:

## (undated) DEVICE — SUT VIC 3/0 SH 27IN UD VCP416H

## (undated) DEVICE — DRSNG WND BORDR/ADHS NONADHR/GZ LF 4X4IN STRL

## (undated) DEVICE — NDL EPID PERIFIX TUOHY 18G 6IN

## (undated) DEVICE — DRP IOBAN ANTIMICRO 23X17IN

## (undated) DEVICE — PK BASIC 70

## (undated) DEVICE — GOWN IMPERV 2XL BLU CA/50

## (undated) DEVICE — INTRO SHEATH FLX 6F65CM

## (undated) DEVICE — NDL SPINE 25G 31/2IN BLU

## (undated) DEVICE — SYR LL TP 10ML STRL

## (undated) DEVICE — DRP C/ARM W/BAND W/CLIPS 41X74IN

## (undated) DEVICE — DRP LAP CHOL W/PCH LF 102X122X78IN STRL

## (undated) DEVICE — Device

## (undated) DEVICE — NDL HYPO ECLPS SFTY 18G 1 1/2IN

## (undated) DEVICE — TBG PENCL TELESCP MEGADYNE SMOKE EVAC 10FT

## (undated) DEVICE — SUT SILK 2/0 SH 30IN K833H

## (undated) DEVICE — SUT VIC 2/0 CT1 36IN UD VCP945H

## (undated) DEVICE — NDL HYPO ECLPS SFTY 25G 1 1/2IN

## (undated) DEVICE — PAD GRND E/S MEGADYNE MONOPLR 2/PLT W/CORD A/ DISP

## (undated) DEVICE — DRSNG SURG ILND ADH ANTIMICROB 4X5IN

## (undated) DEVICE — BLD SCLPL RIBBACK C/SS SZ11

## (undated) DEVICE — DRP SURG 3/4 53X77IN STRL

## (undated) DEVICE — NDL BLNT 18G 1 1/2IN